# Patient Record
Sex: MALE | Race: WHITE
[De-identification: names, ages, dates, MRNs, and addresses within clinical notes are randomized per-mention and may not be internally consistent; named-entity substitution may affect disease eponyms.]

---

## 2021-08-22 ENCOUNTER — TRANSCRIPTION ENCOUNTER (OUTPATIENT)
Age: 46
End: 2021-08-22

## 2022-09-22 PROBLEM — Z00.00 ENCOUNTER FOR PREVENTIVE HEALTH EXAMINATION: Status: ACTIVE | Noted: 2022-09-22

## 2022-09-23 ENCOUNTER — APPOINTMENT (OUTPATIENT)
Dept: ORTHOPEDIC SURGERY | Facility: CLINIC | Age: 47
End: 2022-09-23

## 2022-09-23 VITALS — BODY MASS INDEX: 37.89 KG/M2 | HEIGHT: 68 IN | WEIGHT: 250 LBS

## 2022-09-23 PROCEDURE — 99203 OFFICE O/P NEW LOW 30 MIN: CPT

## 2022-09-23 PROCEDURE — 73521 X-RAY EXAM HIPS BI 2 VIEWS: CPT | Mod: 26

## 2022-09-23 RX ORDER — PREDNISONE 10 MG
TABLET ORAL
Refills: 0 | Status: ACTIVE | COMMUNITY

## 2022-09-23 RX ORDER — ESCITALOPRAM OXALATE 5 MG/1
TABLET, FILM COATED ORAL
Refills: 0 | Status: ACTIVE | COMMUNITY

## 2022-09-23 NOTE — DISCUSSION/SUMMARY
[de-identified] :  discussed x-rays with patient showing osteoarthritis bilateral hips.  Patient has greater trochanteric bursitis, discussed in detail patient.  Discussed treatment options including rest, anti-inflammatories, range-of-motion exercises, physical therapy, cortisone injection.  Patient currently takes a long-term daily  prednisone. advised patient to ice / heat area, stretch.  Physical therapy script given.  Patient will follow-up in 6-8 weeks for re-evaluation.  Call if symptoms worsen or change.  Patient understands agrees with plan.  Seen under supervision of Dr. House.

## 2022-09-23 NOTE — DATA REVIEWED
[FreeTextEntry1] :  bilateral hip x-rays for comparison: moderate osteoarthritis/degenerative changes.  No acute fractures, subluxations, dislocations.

## 2022-09-23 NOTE — HISTORY OF PRESENT ILLNESS
[de-identified] : Patient is a 47-year-old male here for right hip pain.  Patient states he has been having right hip pain for about 3-4 months with no injury or trauma.  States that he does  have history of lower back pain and multiple herniated discs.  Patient states that this pain in his hip is different than his usual pain.  Patient states that it hurts to in certain directions, walking long distances.  Patient denies groin pain. denies numbness and tingling, denies instability.

## 2022-09-23 NOTE — PHYSICAL EXAM
[Right] : right hip [NL (30)] : extension 30 degrees [] : non-antalgic [FreeTextEntry9] :   Mild pain to greater trochanter with range of motion [de-identified] :  good strength throughout [de-identified] :  warm well-perfused [TWNoteComboBox7] : flexion 110 degrees [de-identified] : adduction 30 degrees [de-identified] : external rotation 40 degrees [TWNoteComboBox6] : internal rotation 40 degrees

## 2022-11-17 ENCOUNTER — APPOINTMENT (OUTPATIENT)
Dept: ORTHOPEDIC SURGERY | Facility: CLINIC | Age: 47
End: 2022-11-17

## 2022-11-17 VITALS — HEIGHT: 68 IN | WEIGHT: 235 LBS | BODY MASS INDEX: 35.61 KG/M2

## 2022-11-17 PROCEDURE — 99203 OFFICE O/P NEW LOW 30 MIN: CPT

## 2022-11-17 PROCEDURE — 73562 X-RAY EXAM OF KNEE 3: CPT | Mod: LT

## 2022-11-17 NOTE — ASSESSMENT
[FreeTextEntry1] :  Recommending warm moist compresses, I will send naproxen to the pharmacy.  Patient believes he may have an allergy to penicillin, will prescribe clindamycin.  We discussed avoiding kneeling on the knee.  He has a scheduled follow-up appointment with Renato on 28th.  If symptoms worsen prior to that appointment he will give us a call\par \par This patient was seen under the supervision of Dr. House.\par

## 2022-11-17 NOTE — IMAGING
[de-identified] :   On examination of the left knee patient has mild swelling to the prepatellar bursa very subtle erythema compared to the right knee, mild warmth.  No evidence of knee effusion.  He has full range of motion of the knee.  No tenderness along the joint line.  No tenderness over the patella or quadriceps tendon, mild tenderness over the patella tendon, most point tenderness is noted over the tibial tubercle and prepatellar bursa.\par \par X-ray left knee mild arthritis no fracture or dislocation

## 2022-11-17 NOTE — HISTORY OF PRESENT ILLNESS
[de-identified] : 47-year-old male comes in today for evaluation his left anterior knee pain and swelling that began on November 15th.  Cannot recall specific injury.  He works as an  he does lot of kneeling on his knee.  He noticed some swelling to the knee.  Patient takes prednisone for  angioedema and hypereosinophilia, also takes Lexapro.

## 2022-11-28 ENCOUNTER — APPOINTMENT (OUTPATIENT)
Dept: ORTHOPEDIC SURGERY | Facility: CLINIC | Age: 47
End: 2022-11-28

## 2022-11-28 DIAGNOSIS — M70.42 PREPATELLAR BURSITIS, LEFT KNEE: ICD-10-CM

## 2022-11-28 DIAGNOSIS — M70.61 TROCHANTERIC BURSITIS, RIGHT HIP: ICD-10-CM

## 2022-11-28 PROCEDURE — 99213 OFFICE O/P EST LOW 20 MIN: CPT

## 2022-11-28 NOTE — PHYSICAL EXAM
[Right] : right hip [NL (30)] : extension 30 degrees [FreeTextEntry9] : Mild improvement with range of motion with minimal pain [de-identified] :  good strength throughout [de-identified] :  warm well-perfused [TWNoteComboBox7] : False [de-identified] : False [de-identified] : False [TWNoteComboBox6] : False [Left] : left knee [5___] : hamstring 5[unfilled]/5 [Negative] : negative Sindy's [] : non-antalgic

## 2022-11-28 NOTE — HISTORY OF PRESENT ILLNESS
[de-identified] : Patient is a 47-year-old male here for follow-up right hip greater trochanteric bursitis and left knee prepatellar bursitis.  Patient states overall he has been feeling better especially the left knee.  Patient states that he does still feel pain to the right hip even after physical therapy.  Patient denies numbness/tingling, denies instability.  Patient states hip pain still comes and goes depending on activity

## 2022-11-28 NOTE — DISCUSSION/SUMMARY
[de-identified] : Right hip greater trochanteric bursitis: MRI ordered for further evaluation.  Advised patient continue physical therapy, ice/heat, range of motion exercises.  Call 2 to 3 days after MRI discuss results.\par \par Left knee bursitis: Patient has made a full recovery, doing well, no complaints at this time.\par \par Patient will follow up in 8 weeks for reevaluation.  Call if any questions or concerns.  Patient understands agrees with plan.  Seen under supervision of Dr. House.

## 2022-12-11 ENCOUNTER — EMERGENCY (EMERGENCY)
Facility: HOSPITAL | Age: 47
LOS: 0 days | Discharge: HOME | End: 2022-12-11
Attending: EMERGENCY MEDICINE | Admitting: EMERGENCY MEDICINE
Payer: COMMERCIAL

## 2022-12-11 VITALS
OXYGEN SATURATION: 98 % | HEART RATE: 78 BPM | RESPIRATION RATE: 18 BRPM | SYSTOLIC BLOOD PRESSURE: 147 MMHG | DIASTOLIC BLOOD PRESSURE: 84 MMHG | TEMPERATURE: 98 F | WEIGHT: 240.08 LBS

## 2022-12-11 VITALS — HEART RATE: 87 BPM | SYSTOLIC BLOOD PRESSURE: 108 MMHG | DIASTOLIC BLOOD PRESSURE: 57 MMHG

## 2022-12-11 DIAGNOSIS — N13.2 HYDRONEPHROSIS WITH RENAL AND URETERAL CALCULOUS OBSTRUCTION: ICD-10-CM

## 2022-12-11 DIAGNOSIS — Z88.0 ALLERGY STATUS TO PENICILLIN: ICD-10-CM

## 2022-12-11 DIAGNOSIS — M54.9 DORSALGIA, UNSPECIFIED: ICD-10-CM

## 2022-12-11 DIAGNOSIS — Z20.822 CONTACT WITH AND (SUSPECTED) EXPOSURE TO COVID-19: ICD-10-CM

## 2022-12-11 LAB
ALBUMIN SERPL ELPH-MCNC: 4.2 G/DL — SIGNIFICANT CHANGE UP (ref 3.5–5.2)
ALP SERPL-CCNC: 49 U/L — SIGNIFICANT CHANGE UP (ref 30–115)
ALT FLD-CCNC: 24 U/L — SIGNIFICANT CHANGE UP (ref 0–41)
ANION GAP SERPL CALC-SCNC: 11 MMOL/L — SIGNIFICANT CHANGE UP (ref 7–14)
APPEARANCE UR: CLEAR — SIGNIFICANT CHANGE UP
AST SERPL-CCNC: 25 U/L — SIGNIFICANT CHANGE UP (ref 0–41)
BACTERIA # UR AUTO: NEGATIVE — SIGNIFICANT CHANGE UP
BASOPHILS # BLD AUTO: 0.06 K/UL — SIGNIFICANT CHANGE UP (ref 0–0.2)
BASOPHILS NFR BLD AUTO: 0.7 % — SIGNIFICANT CHANGE UP (ref 0–1)
BILIRUB SERPL-MCNC: 0.5 MG/DL — SIGNIFICANT CHANGE UP (ref 0.2–1.2)
BILIRUB UR-MCNC: NEGATIVE — SIGNIFICANT CHANGE UP
BUN SERPL-MCNC: 21 MG/DL — HIGH (ref 10–20)
CALCIUM SERPL-MCNC: 9.1 MG/DL — SIGNIFICANT CHANGE UP (ref 8.4–10.5)
CHLORIDE SERPL-SCNC: 101 MMOL/L — SIGNIFICANT CHANGE UP (ref 98–110)
CO2 SERPL-SCNC: 24 MMOL/L — SIGNIFICANT CHANGE UP (ref 17–32)
COLOR SPEC: COLORLESS — SIGNIFICANT CHANGE UP
CREAT SERPL-MCNC: 1.6 MG/DL — HIGH (ref 0.7–1.5)
DIFF PNL FLD: ABNORMAL
EGFR: 53 ML/MIN/1.73M2 — LOW
EOSINOPHIL # BLD AUTO: 0.69 K/UL — SIGNIFICANT CHANGE UP (ref 0–0.7)
EOSINOPHIL NFR BLD AUTO: 7.7 % — SIGNIFICANT CHANGE UP (ref 0–8)
EPI CELLS # UR: 0 /HPF — SIGNIFICANT CHANGE UP (ref 0–5)
FLUAV AG NPH QL: SIGNIFICANT CHANGE UP
FLUBV AG NPH QL: SIGNIFICANT CHANGE UP
GLUCOSE SERPL-MCNC: 107 MG/DL — HIGH (ref 70–99)
GLUCOSE UR QL: NEGATIVE — SIGNIFICANT CHANGE UP
HCT VFR BLD CALC: 37.6 % — LOW (ref 42–52)
HGB BLD-MCNC: 13.1 G/DL — LOW (ref 14–18)
HYALINE CASTS # UR AUTO: 0 /LPF — SIGNIFICANT CHANGE UP (ref 0–7)
IMM GRANULOCYTES NFR BLD AUTO: 0.2 % — SIGNIFICANT CHANGE UP (ref 0.1–0.3)
KETONES UR-MCNC: NEGATIVE — SIGNIFICANT CHANGE UP
LEUKOCYTE ESTERASE UR-ACNC: NEGATIVE — SIGNIFICANT CHANGE UP
LIDOCAIN IGE QN: 57 U/L — SIGNIFICANT CHANGE UP (ref 7–60)
LYMPHOCYTES # BLD AUTO: 1.44 K/UL — SIGNIFICANT CHANGE UP (ref 1.2–3.4)
LYMPHOCYTES # BLD AUTO: 16.1 % — LOW (ref 20.5–51.1)
MCHC RBC-ENTMCNC: 29.7 PG — SIGNIFICANT CHANGE UP (ref 27–31)
MCHC RBC-ENTMCNC: 34.8 G/DL — SIGNIFICANT CHANGE UP (ref 32–37)
MCV RBC AUTO: 85.3 FL — SIGNIFICANT CHANGE UP (ref 80–94)
MONOCYTES # BLD AUTO: 0.64 K/UL — HIGH (ref 0.1–0.6)
MONOCYTES NFR BLD AUTO: 7.2 % — SIGNIFICANT CHANGE UP (ref 1.7–9.3)
NEUTROPHILS # BLD AUTO: 6.07 K/UL — SIGNIFICANT CHANGE UP (ref 1.4–6.5)
NEUTROPHILS NFR BLD AUTO: 68.1 % — SIGNIFICANT CHANGE UP (ref 42.2–75.2)
NITRITE UR-MCNC: NEGATIVE — SIGNIFICANT CHANGE UP
NRBC # BLD: 0 /100 WBCS — SIGNIFICANT CHANGE UP (ref 0–0)
PH UR: 6 — SIGNIFICANT CHANGE UP (ref 5–8)
PLATELET # BLD AUTO: 195 K/UL — SIGNIFICANT CHANGE UP (ref 130–400)
POTASSIUM SERPL-MCNC: 4.1 MMOL/L — SIGNIFICANT CHANGE UP (ref 3.5–5)
POTASSIUM SERPL-SCNC: 4.1 MMOL/L — SIGNIFICANT CHANGE UP (ref 3.5–5)
PROT SERPL-MCNC: 7.7 G/DL — SIGNIFICANT CHANGE UP (ref 6–8)
PROT UR-MCNC: NEGATIVE — SIGNIFICANT CHANGE UP
RBC # BLD: 4.41 M/UL — LOW (ref 4.7–6.1)
RBC # FLD: 14.1 % — SIGNIFICANT CHANGE UP (ref 11.5–14.5)
RBC CASTS # UR COMP ASSIST: 2 /HPF — SIGNIFICANT CHANGE UP (ref 0–4)
RSV RNA NPH QL NAA+NON-PROBE: SIGNIFICANT CHANGE UP
SARS-COV-2 RNA SPEC QL NAA+PROBE: SIGNIFICANT CHANGE UP
SODIUM SERPL-SCNC: 136 MMOL/L — SIGNIFICANT CHANGE UP (ref 135–146)
SP GR SPEC: 1.02 — SIGNIFICANT CHANGE UP (ref 1.01–1.03)
UROBILINOGEN FLD QL: SIGNIFICANT CHANGE UP
WBC # BLD: 8.92 K/UL — SIGNIFICANT CHANGE UP (ref 4.8–10.8)
WBC # FLD AUTO: 8.92 K/UL — SIGNIFICANT CHANGE UP (ref 4.8–10.8)
WBC UR QL: 1 /HPF — SIGNIFICANT CHANGE UP (ref 0–5)

## 2022-12-11 PROCEDURE — 74177 CT ABD & PELVIS W/CONTRAST: CPT | Mod: 26,MA

## 2022-12-11 PROCEDURE — 99285 EMERGENCY DEPT VISIT HI MDM: CPT

## 2022-12-11 RX ORDER — SODIUM CHLORIDE 9 MG/ML
1000 INJECTION INTRAMUSCULAR; INTRAVENOUS; SUBCUTANEOUS ONCE
Refills: 0 | Status: COMPLETED | OUTPATIENT
Start: 2022-12-11 | End: 2022-12-11

## 2022-12-11 RX ORDER — MORPHINE SULFATE 50 MG/1
4 CAPSULE, EXTENDED RELEASE ORAL ONCE
Refills: 0 | Status: DISCONTINUED | OUTPATIENT
Start: 2022-12-11 | End: 2022-12-11

## 2022-12-11 RX ORDER — TRAMADOL HYDROCHLORIDE 50 MG/1
1 TABLET ORAL
Qty: 20 | Refills: 0
Start: 2022-12-11 | End: 2022-12-15

## 2022-12-11 RX ORDER — KETOROLAC TROMETHAMINE 30 MG/ML
15 SYRINGE (ML) INJECTION ONCE
Refills: 0 | Status: DISCONTINUED | OUTPATIENT
Start: 2022-12-11 | End: 2022-12-11

## 2022-12-11 RX ADMIN — MORPHINE SULFATE 4 MILLIGRAM(S): 50 CAPSULE, EXTENDED RELEASE ORAL at 02:16

## 2022-12-11 RX ADMIN — SODIUM CHLORIDE 1000 MILLILITER(S): 9 INJECTION INTRAMUSCULAR; INTRAVENOUS; SUBCUTANEOUS at 02:44

## 2022-12-11 RX ADMIN — MORPHINE SULFATE 4 MILLIGRAM(S): 50 CAPSULE, EXTENDED RELEASE ORAL at 04:55

## 2022-12-11 RX ADMIN — Medication 15 MILLIGRAM(S): at 03:14

## 2022-12-11 NOTE — ED PROVIDER NOTE - CLINICAL SUMMARY MEDICAL DECISION MAKING FREE TEXT BOX
47-year-old male recently diagnosed with renal colic presented to ED for left flank pain.  Found to have 6 mm left UVJ stone, pain is well controlled, UA negative for infection, patient was evaluated by urology and is at present stable for discharge home.  Advised to follow-up with urology, patient understands he has to call for appointment tomorrow morning, strict return precautions given.

## 2022-12-11 NOTE — ED PROVIDER NOTE - PROGRESS NOTE DETAILS
SG: urology consulted. Will continue to monitor and reassess. Received pt from Dr. George, Urology evaluated pt, can be dc'ed to f/u with Dr. Diop on Tramadol. Pt is currently comfortable. EP: The patient was endorsed to me by Dr. Kim to follow-up with urology consult and dispo.  The patient was seen by urology and cleared for discharge home, he was advised to call tomorrow to set up an appointment with urologist.  Patient is  currently pain-free, appears very well, and the amenable with the discharge plan.  Prescription for tramadol was sent to the patient's pharmacy up on urology recommendation. Patient to be discharged from ED. Any available test results were discussed with patient and/or family. Verbal instructions given, including instructions to return to ED immediately for any new, worsening, or concerning symptoms. Patient endorsed understanding. Written discharge instructions additionally given, including follow-up plan.  Patient was given opportunity to ask questions.

## 2022-12-11 NOTE — ED ADULT TRIAGE NOTE - CHIEF COMPLAINT QUOTE
Pt presents to the ED c/o of L flank pain. Pt states he went to [facility] Wednesday and was d/x with a 6cm Kidney stone. Pt states he was D/C'd home with Flomax and Toradol. Pt states pain is becoming to great and is straining to pee. Pt denies any fevers

## 2022-12-11 NOTE — ED PROVIDER NOTE - NS ED ROS FT
Constitutional:  No fever, chills, lethargy, or abnormal weight loss  Eyes:  No eye pain or visual changes  ENMT: No nasal discharge, no toothache, no sore throat. No neck pain or stiffness  Cardiac:  No chest pain or palpitations  Respiratory:  No cough or respiratory distress.   GI:  No nausea, vomiting, diarrhea or abdominal pain.  :  No dysuria, frequency or burning.  MS:  see HPI  Neuro:  No headache. No numbness, weakness, or tingling.   Skin:  No skin rash  Except as documented in the HPI,  all other systems are negative

## 2022-12-11 NOTE — ED PROVIDER NOTE - OBJECTIVE STATEMENT
47-year-old male past medical history angioedema and hypereosinophilia presenting with complaints of kidney stone.  Patient complaining of left sided back pain, 10 out of 10, sharp, unable to remain still.  Reports that this has been going on for a while, [facility] where he was diagnosed with a kidney stone that is 6 mm, was told that can be operated on or seen if it could be urinated out and was advised to follow the latter route.  Patient reports he has been on Flomax and ketorolac daily, ran out of ketorolac today, took some ibuprofen recently, still in significant amounts of pain.  Denies any fever, chills, nausea, vomiting, CP, SOB, or any changes in urination frequency or color.

## 2022-12-11 NOTE — ED ADULT NURSE NOTE - OBJECTIVE STATEMENT
pt presents to ED c/o of L flank pain. Pt states he went to [facility] Wednesday and was d/x with a 6cm Kidney stone. Pt states he was D/C'd home with Flomax and Toradol. Pt states pain is becoming to great and is straining to pee. Pt denies any fevers

## 2022-12-11 NOTE — CONSULT NOTE ADULT - SUBJECTIVE AND OBJECTIVE BOX
Urology Consult Note:         [ x ] A 10 Point Review of Systems was negative except where noted    Allergies: penicillin (Unknown)    SOCIAL HISTORY: No illicit drug use    Vital Signs Last 24 Hrs  T(C): 36.4 (11 Dec 2022 01:00), Max: 36.4 (11 Dec 2022 01:00)  T(F): 97.6 (11 Dec 2022 01:00), Max: 97.6 (11 Dec 2022 01:00)  HR: 87 (11 Dec 2022 06:19) (78 - 87)  BP: 108/57 (11 Dec 2022 06:19) (108/57 - 147/84)  RR: 18 (11 Dec 2022 01:00) (18 - 18)  SpO2: 98% (11 Dec 2022 01:00) (98% - 98%)    Parameters below as of 11 Dec 2022 01:00  Patient On (Oxygen Delivery Method): room air      PHYSICAL EXAM:  Constitutional: NAD, well-developed  HEENT: NC/AT  Back: No CVA ttp bilaterally  Resp: No accessory respiratory muscle use  Abd: Soft, NT/ND. No suprapubic ttp  Cardio: +S1/S2  : Freely voiding  Ext: No edema or cyanosis, AGUIALR x 4  Neuro: Awake and alert  Psych: Normal mood, normal affect  Skin: Good color, non diaphoretic      LABS:                       13.1   8.92  )-----------( 195      ( 11 Dec 2022 02:50 )             37.6     12-  136  |  101  |  21<H>  ----------------------------<  107<H>  4.1   |  24  |  1.6<H>    Ca    9.1      11 Dec 2022 02:50    TPro  7.7  /  Alb  4.2  /  TBili  0.5  /  DBili  x   /  AST  25  /  ALT  24  /  AlkPhos  49  12-11      Urinalysis Basic - ( 11 Dec 2022 05:15 )    Color: Colorless / Appearance: Clear / S.022 / pH: x  Gluc: x / Ketone: Negative  / Bili: Negative / Urobili: <2 mg/dL   Blood: x / Protein: Negative / Nitrite: Negative   Leuk Esterase: Negative / RBC: 2 /HPF / WBC 1 /HPF   Sq Epi: x / Non Sq Epi: 0 /HPF / Bacteria: Negative      RADIOLOGY & ADDITIONAL STUDIES:    ACC: [id number] EXAM:  CT ABDOMEN AND PELVIS IC                          PROCEDURE DATE:  2022      INTERPRETATION:  CLINICAL HISTORY / REASON FOR EXAM: Left flank pain.    TECHNIQUE: Contiguous axial CT images were obtained from the lower chest   to the pubic symphysis following the administration of 100 mL Omnipaque   350 intravenous contrast. Oral contrast was not administered. Reformatted   images in the coronal and sagittal planes were acquired.    COMPARISON CT: No studies are available for direct comparison.    FINDINGS:    LOWER CHEST: Left lower lobe consolidative opacity with air bronchograms.    HEPATOBILIARY: Unremarkable.    SPLEEN: Splenomegaly measuring approximately 15 cm (8/35). 2 similar   appearing splenic hypodensities which are indeterminate measuring 1.5 cm   (5/96) and 3 cm (5/186).    PANCREAS: Unremarkable.    ADRENAL GLANDS: Unremarkable.    KIDNEYS: Mild left hydroureteronephrosis secondary to a left UVJ calculus   measuring 6 x 3 x 4 mm (894 HU). No right kidney obstructing calculus or   hydronephrosis.    ABDOMINOPELVIC NODES: Unremarkable.    PELVIC ORGANS: Urinary bladder wall thickening versus underdistention,   correlate with urinalysis..    PERITONEUM/MESENTERY/BOWEL: No bowel obstruction orwall thickening.   Unremarkable appendix. No pneumoperitoneum or ascites.. Small bilateral   fat-containing inguinal hernias. An umbilical hernia contains fat and a   tiny focus of loculated fluid.    BONES/SOFT TISSUES: Degenerative changes of the spine. Moderate to severe   degenerative changes of the right hip.    VASCULAR: Abdominal aorta is normal in caliber..      IMPRESSION:    Mild left hydroureteronephrosis secondary to a left UVJ calculus   measuring up to 6 mm.    Underdistention versus urinary bladder wall thickening. Correlate with   urinalysis.    Left lower lobe consolidative opacity with air bronchograms. Correlate   for signs/symptoms of pneumonia. Follow-up to demonstrate resolution is   recommended.    Splenomegaly with 2 similar appearing indeterminate splenic hypodensities   measuring 1.5 and 3.0 cm.    --- End of Report ---    NOA WILCOX MD; Resident Radiologist  This document has been electronically signed.  ADRIÁN KWOK MD; Attending Radiologist  This document has been electronically signed. Dec 11 2022  5:55AM   Urology Consult Note: 47 year old male pmh angioedema and hypereosinophilia presenting with complaints of kidney stone. Patient was in [facility] on Wednesday where he was diagnosed with a kidney stone that is 6 mm, was told that can be operated on or attempt to continue with conservative management and trial of passage with patient electing conservative management. Patient presents today with complaints of pain as his pain medication (Toradol x3 days) had run out and is complaining of moderate left sided flank pain, with urinary hesitancy, frequency and urgency. Patient reports he has also been on Flomax, took some ibuprofen recently, still in pain.  Denies any fever, chills, nausea, vomiting, CP, SOB, or other LUTS.    [ x ] A 10 Point Review of Systems was negative except where noted    Allergies: penicillin (Unknown)    SOCIAL HISTORY: No illicit drug use    Vital Signs Last 24 Hrs  T(C): 36.4 (11 Dec 2022 01:00), Max: 36.4 (11 Dec 2022 01:00)  T(F): 97.6 (11 Dec 2022 01:00), Max: 97.6 (11 Dec 2022 01:00)  HR: 87 (11 Dec 2022 06:19) (78 - 87)  BP: 108/57 (11 Dec 2022 06:19) (108/57 - 147/84)  RR: 18 (11 Dec 2022 01:00) (18 - 18)  SpO2: 98% (11 Dec 2022 01:00) (98% - 98%)    Parameters below as of 11 Dec 2022 01:00  Patient On (Oxygen Delivery Method): room air      PHYSICAL EXAM:  Constitutional: NAD, well-developed, well nourished, comfortably sitting in chair  HEENT: NC/AT  Back: No right CVA ttp, +mild left CVA ttp  Resp: No accessory respiratory muscle use  Abd: Soft, NT/ND. No suprapubic ttp  Cardio: +S1/S2  : Freely voiding  Ext: No edema or cyanosis, AGUILAR x 4  Neuro: Awake and alert  Psych: Normal mood, normal affect  Skin: Good color, non diaphoretic      LABS:                       13.1   8.92  )-----------( 195      ( 11 Dec 2022 02:50 )             37.6     12-  136  |  101  |  21<H>  ----------------------------<  107<H>  4.1   |  24  |  1.6<H>    Ca    9.1      11 Dec 2022 02:50    TPro  7.7  /  Alb  4.2  /  TBili  0.5  /  DBili  x   /  AST  25  /  ALT  24  /  AlkPhos  49  12-11      Urinalysis Basic - ( 11 Dec 2022 05:15 )    Color: Colorless / Appearance: Clear / S.022 / pH: x  Gluc: x / Ketone: Negative  / Bili: Negative / Urobili: <2 mg/dL   Blood: x / Protein: Negative / Nitrite: Negative   Leuk Esterase: Negative / RBC: 2 /HPF / WBC 1 /HPF   Sq Epi: x / Non Sq Epi: 0 /HPF / Bacteria: Negative      RADIOLOGY & ADDITIONAL STUDIES:    ACC: [id number] EXAM:  CT ABDOMEN AND PELVIS IC                          PROCEDURE DATE:  2022      INTERPRETATION:  CLINICAL HISTORY / REASON FOR EXAM: Left flank pain.    TECHNIQUE: Contiguous axial CT images were obtained from the lower chest   to the pubic symphysis following the administration of 100 mL Omnipaque   350 intravenous contrast. Oral contrast was not administered. Reformatted   images in the coronal and sagittal planes were acquired.    COMPARISON CT: No studies are available for direct comparison.    FINDINGS:    LOWER CHEST: Left lower lobe consolidative opacity with air bronchograms.    HEPATOBILIARY: Unremarkable.    SPLEEN: Splenomegaly measuring approximately 15 cm (8/35). 2 similar   appearing splenic hypodensities which are indeterminate measuring 1.5 cm   (5/96) and 3 cm (5/186).    PANCREAS: Unremarkable.    ADRENAL GLANDS: Unremarkable.    KIDNEYS: Mild left hydroureteronephrosis secondary to a left UVJ calculus   measuring 6 x 3 x 4 mm (894 HU). No right kidney obstructing calculus or   hydronephrosis.    ABDOMINOPELVIC NODES: Unremarkable.    PELVIC ORGANS: Urinary bladder wall thickening versus underdistention,   correlate with urinalysis..    PERITONEUM/MESENTERY/BOWEL: No bowel obstruction or wall thickening.   Unremarkable appendix. No pneumoperitoneum or ascites.. Small bilateral   fat-containing inguinal hernias. An umbilical hernia contains fat and a   tiny focus of loculated fluid.    BONES/SOFT TISSUES: Degenerative changes of the spine. Moderate to severe   degenerative changes of the right hip.    VASCULAR: Abdominal aorta is normal in caliber..      IMPRESSION:    Mild left hydroureteronephrosis secondary to a left UVJ calculus   measuring up to 6 mm.    Underdistention versus urinary bladder wall thickening. Correlate with   urinalysis.    Left lower lobe consolidative opacity with air bronchograms. Correlate   for signs/symptoms of pneumonia. Follow-up to demonstrate resolution is   recommended.    Splenomegaly with 2 similar appearing indeterminate splenic hypodensities   measuring 1.5 and 3.0 cm.    --- End of Report ---    NOA WILCOX MD; Resident Radiologist  This document has been electronically signed.  ADRIÁN KWOK MD; Attending Radiologist  This document has been electronically signed. Dec 11 2022  5:55AM

## 2022-12-11 NOTE — ED PROVIDER NOTE - PATIENT PORTAL LINK FT
You can access the FollowMyHealth Patient Portal offered by [facility] by registering at the following website: [email or web address]. By joining [employer]’s FollowMyHealth portal, you will also be able to view your health information using other applications (apps) compatible with our system.

## 2022-12-11 NOTE — ED PROVIDER NOTE - CARE PROVIDER_API CALL
Orestes Diop)  Urology  [address] [place] [address], [address]  [place] [address]  Phone: [phone]  Fax: [phone]  Follow Up Time:

## 2022-12-11 NOTE — ED PROVIDER NOTE - CARE PROVIDERS DIRECT ADDRESSES
[email or web address]

## 2022-12-11 NOTE — ED ADULT NURSE NOTE - CHIEF COMPLAINT QUOTE
Pt presents to the ED c/o of L flank pain. Pt states he went to [facility] Wednesday and was d/x with a 6cm Kidney stone. Pt states he was D/C'd home with Flomax and Toradol. Pt states pain is becoming to great and is straining to pee. Pt denies any fevers

## 2022-12-11 NOTE — ED ADULT NURSE REASSESSMENT NOTE - NS ED NURSE REASSESS COMMENT FT1
Pt assessed, A&OX4. Pt denies pain/discomfort at this time, awaiting dispo. No acute distress noted. Safety and comfort measures maintained. Will continue to monitor

## 2022-12-11 NOTE — CONSULT NOTE ADULT - ASSESSMENT
Plan:  - No acute surgical/ intervention indicated at this time; patient's pain is controlled in the ED with fluids and pain medication. At this time patient wishes to continue with trial of passage and pain management at home.  - Recommend Tramadol q4-6 hrs prn for pain control  - Continue flomax if not contraindicated  - Continue antiemetics prn  - Encourage increased hydration  - Encourage increased ambulation  - Recommend strain all urine to catch the stone if it passes so that stone may be sent for analysis; provided patient with strainer  - Recommend OP f/u with Dr. Diop this week for further urologic management - Please call the office tomorrow morning for an appointment  - Recommend strict return precautions: Please return to the ED if you develop fevers of 100.3F or greater, intractable pain, intractable vomiting, inability to tolerate po, maintain secretions or difficulty breathing or overall worsening of symptoms.  - ED team and patient agreeable to plan  47 year old male recently seen in [facility] was diagnosed with a kidney stone that is 6 mm, now presenting with pain after completing is previously prescribed pain medication. Pain controlled in ED with fluids and pain medication, patient wishes to continue with conservative management at this time and trial of passage at home.     Plan:  - No acute surgical/ intervention indicated at this time; patient's pain is controlled in the ED with fluids and pain medication. At this time patient wishes to continue with trial of passage and pain management at home.  - Recommend continue pain control with Tramadol q4-6 hrs prn   - Continue flomax if not contraindicated  - Continue antiemetics prn  - Encourage increased hydration  - Encourage increased ambulation  - Recommend strain all urine to catch the stone if it passes so that stone may be sent for analysis; provided patient with strainer  - Recommend OP f/u with Dr. Diop this week for further urologic management - Please call the office tomorrow morning for an appointment  - Recommend strict return precautions: Please return to the ED if you develop fevers of 100.3F or greater, intractable pain, intractable vomiting, inability to tolerate po, maintain secretions or difficulty breathing or overall worsening of symptoms.  - ED team and patient agreeable to plan

## 2022-12-11 NOTE — ED PROVIDER NOTE - ATTENDING CONTRIBUTION TO CARE
47-year-old male with a past medical history of nephrolithiasis presents with left flank discomfort.  Patient was out side hospital couple of days ago and diagnosed with 6 mm kidney stone.  No fever or chills.  Pain has returned.  No chest pain.  On exam slightly uncomfortable appearing but nontoxic, no CVA tenderness or abdominal tenderness.  CT with obstructive stone.  Mild hydronephrosis.  Still uncomfortable after multiple rounds of pain medicine so we will consult urology.

## 2022-12-11 NOTE — ED ADULT NURSE NOTE - NSSUHOSCREENINGYN_ED_ALL_ED
Yes - the patient is able to be screened
Airway patent, Nasal mucosa clear. Mouth with normal mucosa. Throat has no vesicles, no oropharyngeal exudates and uvula is midline.

## 2022-12-11 NOTE — ED PROVIDER NOTE - PHYSICAL EXAMINATION
Gen: Alert, NAD, well appearing, pacing  Head: NC, AT, EOMI  Pulm: Bilateral BS, normal resp effort, no wheeze/stridor/retractions  CV: RRR, no M/R/G, +dist pulses  Abd: soft, NT/ND, pos Left CVAT  Mskel: no edema/erythema/cyanosis  Skin: no rash, warm/dry  Neuro: AAOx3, no sensory/motor deficits, CN 2-12 grossly intact

## 2022-12-13 ENCOUNTER — TRANSCRIPTION ENCOUNTER (OUTPATIENT)
Age: 47
End: 2022-12-13

## 2022-12-13 ENCOUNTER — INPATIENT (INPATIENT)
Facility: HOSPITAL | Age: 47
LOS: 0 days | Discharge: HOME | End: 2022-12-13
Attending: UROLOGY | Admitting: UROLOGY

## 2022-12-13 VITALS
HEART RATE: 88 BPM | HEIGHT: 68 IN | TEMPERATURE: 96 F | WEIGHT: 240.08 LBS | DIASTOLIC BLOOD PRESSURE: 83 MMHG | SYSTOLIC BLOOD PRESSURE: 137 MMHG | RESPIRATION RATE: 20 BRPM | OXYGEN SATURATION: 100 %

## 2022-12-13 VITALS — HEART RATE: 74 BPM | DIASTOLIC BLOOD PRESSURE: 86 MMHG | SYSTOLIC BLOOD PRESSURE: 153 MMHG | RESPIRATION RATE: 18 BRPM

## 2022-12-13 LAB
ALBUMIN SERPL ELPH-MCNC: 4 G/DL — SIGNIFICANT CHANGE UP (ref 3.5–5.2)
ALP SERPL-CCNC: 49 U/L — SIGNIFICANT CHANGE UP (ref 30–115)
ALT FLD-CCNC: 21 U/L — SIGNIFICANT CHANGE UP (ref 0–41)
ANION GAP SERPL CALC-SCNC: 6 MMOL/L — LOW (ref 7–14)
APPEARANCE UR: CLEAR — SIGNIFICANT CHANGE UP
AST SERPL-CCNC: 22 U/L — SIGNIFICANT CHANGE UP (ref 0–41)
BACTERIA # UR AUTO: ABNORMAL
BASOPHILS # BLD AUTO: 0.04 K/UL — SIGNIFICANT CHANGE UP (ref 0–0.2)
BASOPHILS NFR BLD AUTO: 0.9 % — SIGNIFICANT CHANGE UP (ref 0–1)
BILIRUB SERPL-MCNC: 0.6 MG/DL — SIGNIFICANT CHANGE UP (ref 0.2–1.2)
BILIRUB UR-MCNC: NEGATIVE — SIGNIFICANT CHANGE UP
BUN SERPL-MCNC: 16 MG/DL — SIGNIFICANT CHANGE UP (ref 10–20)
CALCIUM SERPL-MCNC: 9.1 MG/DL — SIGNIFICANT CHANGE UP (ref 8.4–10.5)
CHLORIDE SERPL-SCNC: 101 MMOL/L — SIGNIFICANT CHANGE UP (ref 98–110)
CO2 SERPL-SCNC: 29 MMOL/L — SIGNIFICANT CHANGE UP (ref 17–32)
COLOR SPEC: YELLOW — SIGNIFICANT CHANGE UP
CREAT SERPL-MCNC: 1.4 MG/DL — SIGNIFICANT CHANGE UP (ref 0.7–1.5)
DIFF PNL FLD: ABNORMAL
EGFR: 62 ML/MIN/1.73M2 — SIGNIFICANT CHANGE UP
EOSINOPHIL # BLD AUTO: 0.36 K/UL — SIGNIFICANT CHANGE UP (ref 0–0.7)
EOSINOPHIL NFR BLD AUTO: 7.7 % — SIGNIFICANT CHANGE UP (ref 0–8)
EPI CELLS # UR: NEGATIVE — SIGNIFICANT CHANGE UP
GLUCOSE SERPL-MCNC: 102 MG/DL — HIGH (ref 70–99)
GLUCOSE UR QL: NEGATIVE MG/DL — SIGNIFICANT CHANGE UP
HCT VFR BLD CALC: 35.9 % — LOW (ref 42–52)
HGB BLD-MCNC: 12.5 G/DL — LOW (ref 14–18)
IMM GRANULOCYTES NFR BLD AUTO: 0.2 % — SIGNIFICANT CHANGE UP (ref 0.1–0.3)
KETONES UR-MCNC: NEGATIVE — SIGNIFICANT CHANGE UP
LEUKOCYTE ESTERASE UR-ACNC: NEGATIVE — SIGNIFICANT CHANGE UP
LYMPHOCYTES # BLD AUTO: 1.08 K/UL — LOW (ref 1.2–3.4)
LYMPHOCYTES # BLD AUTO: 23.2 % — SIGNIFICANT CHANGE UP (ref 20.5–51.1)
MCHC RBC-ENTMCNC: 30.6 PG — SIGNIFICANT CHANGE UP (ref 27–31)
MCHC RBC-ENTMCNC: 34.8 G/DL — SIGNIFICANT CHANGE UP (ref 32–37)
MCV RBC AUTO: 88 FL — SIGNIFICANT CHANGE UP (ref 80–94)
MONOCYTES # BLD AUTO: 0.4 K/UL — SIGNIFICANT CHANGE UP (ref 0.1–0.6)
MONOCYTES NFR BLD AUTO: 8.6 % — SIGNIFICANT CHANGE UP (ref 1.7–9.3)
NEUTROPHILS # BLD AUTO: 2.76 K/UL — SIGNIFICANT CHANGE UP (ref 1.4–6.5)
NEUTROPHILS NFR BLD AUTO: 59.4 % — SIGNIFICANT CHANGE UP (ref 42.2–75.2)
NITRITE UR-MCNC: NEGATIVE — SIGNIFICANT CHANGE UP
NRBC # BLD: 0 /100 WBCS — SIGNIFICANT CHANGE UP (ref 0–0)
PH UR: 6.5 — SIGNIFICANT CHANGE UP (ref 5–8)
PLATELET # BLD AUTO: 178 K/UL — SIGNIFICANT CHANGE UP (ref 130–400)
POTASSIUM SERPL-MCNC: 4.4 MMOL/L — SIGNIFICANT CHANGE UP (ref 3.5–5)
POTASSIUM SERPL-SCNC: 4.4 MMOL/L — SIGNIFICANT CHANGE UP (ref 3.5–5)
PROT SERPL-MCNC: 7.8 G/DL — SIGNIFICANT CHANGE UP (ref 6–8)
PROT UR-MCNC: NEGATIVE MG/DL — SIGNIFICANT CHANGE UP
RBC # BLD: 4.08 M/UL — LOW (ref 4.7–6.1)
RBC # FLD: 13.9 % — SIGNIFICANT CHANGE UP (ref 11.5–14.5)
RBC CASTS # UR COMP ASSIST: SIGNIFICANT CHANGE UP /HPF
SARS-COV-2 RNA SPEC QL NAA+PROBE: SIGNIFICANT CHANGE UP
SODIUM SERPL-SCNC: 136 MMOL/L — SIGNIFICANT CHANGE UP (ref 135–146)
SP GR SPEC: 1.01 — SIGNIFICANT CHANGE UP (ref 1.01–1.03)
UROBILINOGEN FLD QL: 0.2 MG/DL — SIGNIFICANT CHANGE UP
WBC # BLD: 4.65 K/UL — LOW (ref 4.8–10.8)
WBC # FLD AUTO: 4.65 K/UL — LOW (ref 4.8–10.8)
WBC UR QL: SIGNIFICANT CHANGE UP /HPF

## 2022-12-13 PROCEDURE — 99223 1ST HOSP IP/OBS HIGH 75: CPT | Mod: 25

## 2022-12-13 PROCEDURE — 52352 CYSTOURETERO W/STONE REMOVE: CPT | Mod: LT

## 2022-12-13 PROCEDURE — 52332 CYSTOSCOPY AND TREATMENT: CPT | Mod: LT

## 2022-12-13 PROCEDURE — 99285 EMERGENCY DEPT VISIT HI MDM: CPT

## 2022-12-13 RX ORDER — ACETAMINOPHEN 500 MG
650 TABLET ORAL EVERY 6 HOURS
Refills: 0 | Status: DISCONTINUED | OUTPATIENT
Start: 2022-12-13 | End: 2022-12-13

## 2022-12-13 RX ORDER — LANOLIN ALCOHOL/MO/W.PET/CERES
3 CREAM (GRAM) TOPICAL AT BEDTIME
Refills: 0 | Status: DISCONTINUED | OUTPATIENT
Start: 2022-12-13 | End: 2022-12-13

## 2022-12-13 RX ORDER — SODIUM CHLORIDE 9 MG/ML
1000 INJECTION, SOLUTION INTRAVENOUS
Refills: 0 | Status: DISCONTINUED | OUTPATIENT
Start: 2022-12-13 | End: 2022-12-13

## 2022-12-13 RX ORDER — ONDANSETRON 8 MG/1
4 TABLET, FILM COATED ORAL ONCE
Refills: 0 | Status: DISCONTINUED | OUTPATIENT
Start: 2022-12-13 | End: 2022-12-13

## 2022-12-13 RX ORDER — PHENAZOPYRIDINE HCL 100 MG
1 TABLET ORAL
Qty: 6 | Refills: 0
Start: 2022-12-13 | End: 2022-12-14

## 2022-12-13 RX ORDER — ESCITALOPRAM OXALATE 10 MG/1
0 TABLET, FILM COATED ORAL
Qty: 0 | Refills: 0 | DISCHARGE

## 2022-12-13 RX ORDER — OXYCODONE HYDROCHLORIDE 5 MG/1
5 TABLET ORAL ONCE
Refills: 0 | Status: DISCONTINUED | OUTPATIENT
Start: 2022-12-13 | End: 2022-12-13

## 2022-12-13 RX ORDER — CHLORHEXIDINE GLUCONATE 213 G/1000ML
1 SOLUTION TOPICAL
Refills: 0 | Status: DISCONTINUED | OUTPATIENT
Start: 2022-12-13 | End: 2022-12-13

## 2022-12-13 RX ORDER — ESCITALOPRAM OXALATE 10 MG/1
10 TABLET, FILM COATED ORAL DAILY
Refills: 0 | Status: DISCONTINUED | OUTPATIENT
Start: 2022-12-13 | End: 2022-12-13

## 2022-12-13 RX ORDER — KETOROLAC TROMETHAMINE 30 MG/ML
15 SYRINGE (ML) INJECTION ONCE
Refills: 0 | Status: DISCONTINUED | OUTPATIENT
Start: 2022-12-13 | End: 2022-12-13

## 2022-12-13 RX ORDER — ONDANSETRON 8 MG/1
4 TABLET, FILM COATED ORAL EVERY 8 HOURS
Refills: 0 | Status: DISCONTINUED | OUTPATIENT
Start: 2022-12-13 | End: 2022-12-13

## 2022-12-13 RX ORDER — HYDROMORPHONE HYDROCHLORIDE 2 MG/ML
0.5 INJECTION INTRAMUSCULAR; INTRAVENOUS; SUBCUTANEOUS
Refills: 0 | Status: DISCONTINUED | OUTPATIENT
Start: 2022-12-13 | End: 2022-12-13

## 2022-12-13 RX ORDER — TAMSULOSIN HYDROCHLORIDE 0.4 MG/1
1 CAPSULE ORAL
Qty: 14 | Refills: 0
Start: 2022-12-13 | End: 2022-12-26

## 2022-12-13 RX ORDER — SODIUM CHLORIDE 9 MG/ML
1000 INJECTION INTRAMUSCULAR; INTRAVENOUS; SUBCUTANEOUS ONCE
Refills: 0 | Status: COMPLETED | OUTPATIENT
Start: 2022-12-13 | End: 2022-12-13

## 2022-12-13 RX ADMIN — Medication 15 MILLIGRAM(S): at 11:26

## 2022-12-13 RX ADMIN — SODIUM CHLORIDE 1000 MILLILITER(S): 9 INJECTION INTRAMUSCULAR; INTRAVENOUS; SUBCUTANEOUS at 07:49

## 2022-12-13 NOTE — DISCHARGE NOTE PROVIDER - NSDCMRMEDTOKEN_GEN_ALL_CORE_FT
ESCITALOPRAM 10MG TABLETS: TAKE 1 TABLET BY MOUTH EVERY DAY  Flomax 0.4 mg oral capsule: 1 cap(s) orally once a day MDD:0.4mg  phenazopyridine 200 mg oral tablet: 1 tab(s) orally 3 times a day (after meals) MDD:600mg  PREDNISONE 10 MG TABLET:

## 2022-12-13 NOTE — BRIEF OPERATIVE NOTE - OPERATION/FINDINGS
left distal ureteral stone removed with basket  left ureteral stent left in place -- to be removed on friday by pulling string

## 2022-12-13 NOTE — DISCHARGE NOTE PROVIDER - NSDCFUSCHEDAPPT_GEN_ALL_CORE_FT
Roland Langston  [facility]  UROLOGY [address]  Scheduled Appointment: 12/20/2022    [facility]  [address]  Scheduled Appointment: 01/23/2023

## 2022-12-13 NOTE — ASU DISCHARGE PLAN (ADULT/PEDIATRIC) - NS MD DC FALL RISK RISK
For information on Fall & Injury Prevention, visit: [email or web address] OR  [email or web address] OR  https://www.cdc.gov/steadi/patient.html

## 2022-12-13 NOTE — ED ADULT NURSE NOTE - NSIMPLEMENTINTERV_GEN_ALL_ED
Implemented All Universal Safety Interventions:  [place] to call system. Call bell, personal items and telephone within reach. Instruct patient to call for assistance. Room bathroom lighting operational. Non-slip footwear when patient is off stretcher. Physically safe environment: no spills, clutter or unnecessary equipment. Stretcher in lowest position, wheels locked, appropriate side rails in place.

## 2022-12-13 NOTE — DISCHARGE NOTE PROVIDER - NSDCCPCAREPLAN_GEN_ALL_CORE_FT
PRINCIPAL DISCHARGE DIAGNOSIS  Diagnosis: Kidney stone  Assessment and Plan of Treatment: Renal Colic is pain that is caused by passing a kidney stone. The pain   Contact a health care provider if:  You have a fever or chills.  Your urine smells bad or looks cloudy.  You have pain or burning when you pass urine.  Get help right away if:  Your flank pain or groin pain suddenly worsens.  You become confused or disoriented or you lose consciousness.  This information is not intended to replace advice given to you by your health care provider. Make sure you discuss any questions you have with your health care provider.

## 2022-12-13 NOTE — ASU DISCHARGE PLAN (ADULT/PEDIATRIC) - CARE PROVIDER_API CALL
Cam Mcmillan)  Urology  [address] [place] [address], [address]  [place] [address]  Phone: [phone]  Fax: [phone]  Scheduled Appointment: 12/16/2022

## 2022-12-13 NOTE — CHART NOTE - NSCHARTNOTEFT_GEN_A_CORE
PACU ANESTHESIA ADMISSION NOTE      Procedure: Manipulation, calculus, ureter    Cystourethroscopy with insertion of ureteral stent in adult      Post op diagnosis:  Left ureteral stone    Hydronephrosis, left    DANIELA (acute kidney injury)    Lt flank pain        ____  Intubated  TV:______       Rate: ______      FiO2: ______    __x__  Patent Airway    _x___  Full return of protective reflexes    __x__  Full recovery from anesthesia / back to baseline     Vitals:   T: 98          R: 16                 BP:  159/94                Sat:  98%                 P: 96      Mental Status:  _x___ Awake   _____ Alert   _____ Drowsy   _____ Sedated    Nausea/Vomiting:  __x__ NO  ______Yes,   See Post - Op Orders          Pain Scale (0-10):  _____    Treatment: ____ None    ___x_ See Post - Op/PCA Orders    Post - Operative Fluids:   ____ Oral   __x__ See Post - Op Orders    Plan: Discharge:   __x__Home       _____Floor     _____Critical Care    _____  Other:_________________    Comments:

## 2022-12-13 NOTE — ED PROVIDER NOTE - PHYSICAL EXAMINATION
CONSTITUTIONAL: well-developed, well-nourished, in no acute distress comfortable appearing  SKIN: warm, dry  HEAD: Normocephalic  EYES: no conjunctival erythema  ENT: no nasal discharge, airway clear  NECK: full ROM  CARD: regular rate and rhythm  RESP: normal respiratory effort, no wheezes, rales or rhonchi  ABD: soft, non-distended, non-tender  : no cva tenderness  EXT: moving all extremities spontaneously  NEURO: alert and oriented, grossly unremarkable  PSYCH: cooperative, appropriate

## 2022-12-13 NOTE — H&P ADULT - NS ATTEND AMEND GEN_ALL_CORE FT
seen at bedside  has Acute kidney injury likely due to obstructing left ureteral stone  also with left flank pain and left hydronephrosis  history of renal stones years ago  pain has been unrelenting -- has been to three ERs in the last week  agrees to proceed with procedure today -- left ureteroscopy laser lithotripsy and left ureteral stent placement  aware that stent needs to come out in a few weeks and that the stone needs to be fragmented

## 2022-12-13 NOTE — DISCHARGE NOTE PROVIDER - CARE PROVIDER_API CALL
Cam Mcmillan)  Urology  [address] [place] [address], [address]  [place] [address]  Phone: [phone]  Fax: [phone]  Scheduled Appointment: 12/16/2022

## 2022-12-13 NOTE — H&P ADULT - NSHPLABSRESULTS_GEN_ALL_CORE
Labs:  CAPILLARY BLOOD GLUCOSE                          12.5   4.65  )-----------( 178      ( 13 Dec 2022 07:30 )             35.9       Auto Neutrophil %: 59.4 % (22 @ 07:30)  Auto Immature Granulocyte %: 0.2 % (22 @ 07:30)        136  |  101  |  16  ----------------------------<  102<H>  4.4   |  29  |  1.4      Calcium, Total Serum: 9.1 mg/dL (22 @ 07:30)      LFTs:             7.8  | 0.6  | 22       ------------------[49      ( 13 Dec 2022 07:30 )  4.0  | x    | 21          Lipase:x      Amylase:x             Coags:            Urinalysis Basic - ( 13 Dec 2022 08:10 )    Color: Yellow / Appearance: Clear / S.015 / pH: x  Gluc: x / Ketone: Negative  / Bili: Negative / Urobili: 0.2 mg/dL   Blood: x / Protein: Negative mg/dL / Nitrite: Negative   Leuk Esterase: Negative / RBC: 1-2 /HPF / WBC 3-5 /HPF   Sq Epi: x / Non Sq Epi: Negative / Bacteria: Few      RADIOLOGY & ADDITIONAL STUDIES:    EXAM:  CT ABDOMEN AND PELVIS IC                      PROCEDURE DATE:  2022     CLINICAL HISTORY / REASON FOR EXAM: Left flank pain.    FINDINGS:  LOWER CHEST: Left lower lobe consolidative opacity with air bronchograms.  HEPATOBILIARY: Unremarkable.  SPLEEN: Splenomegaly measuring approximately 15 cm (8/35). 2 similar   appearing splenic hypodensities which are indeterminate measuring 1.5 cm   (5/96) and 3 cm (5/186).    PANCREAS: Unremarkable.    ADRENAL GLANDS: Unremarkable.    KIDNEYS: Mild left hydroureteronephrosis secondary to a left UVJ calculus   measuring 6 x 3 x 4 mm (894 HU). No right kidney obstructing calculus or   hydronephrosis.    IMPRESSION:    Mild left hydroureteronephrosis secondary to a left UVJ calculus   measuring up to 6 mm.    Underdistention versus urinary bladder wall thickening. Correlate with   urinalysis.    Left lower lobe consolidative opacity with air bronchograms. Correlate   for signs/symptoms of pneumonia. Follow-up to demonstrate resolution is   recommended.    Splenomegaly with 2 similar appearing indeterminate splenic hypodensities   measuring 1.5 and 3.0 cm.    --- End of Report ---    NOA WILCOX MD; Resident Radiolog

## 2022-12-13 NOTE — BRIEF OPERATIVE NOTE - NSICDXBRIEFPOSTOP_GEN_ALL_CORE_FT
POST-OP DIAGNOSIS:  Left ureteral stone 13-Dec-2022 15:52:07  Cam Mcmillan  Hydronephrosis, left 13-Dec-2022 15:52:37  Cam Mcmillan  DANIELA (acute kidney injury) 13-Dec-2022 15:52:47  Cam Mcmillan  Lt flank pain 13-Dec-2022 15:53:27  Cam Mcmillan

## 2022-12-13 NOTE — CONSULT NOTE ADULT - ASSESSMENT
48 y/o M PMHx hypereosinophilia & angioedema presenting to ED for 1 week h/o flank pain and intermittent urinary dribbling  Pt was seen at [facility] ED yesterday for flank pain. CTAP was done w/ findings c/w L 4 x 6 mm UVJ stone.     Plan:  INCOMPLETE 48 y/o M PMHx hypereosinophilia & angioedema presenting to ED for 1 week h/o flank pain and intermittent urinary dribbling  Pt was seen at [facility] ED yesterday for flank pain. CTAP was done w/ findings c/w L 4 x 6 mm UVJ stone.     Plan:  OR today for cystoscopy and stent placement  Keep NPO  IVF  IV pain meds prn and IV antiemetics      Pt d/w Dr. Mcmillan

## 2022-12-13 NOTE — ED PROVIDER NOTE - OBJECTIVE STATEMENT
48 y/o M PMHx hypereosinophilia & angioedema & h/o kidney stones ( 10 years ago & managed conservatively  presenting to ED for 1 week h/o flank pain and intermittent urinary dribbling  Pt was seen at [facility] ED yesterday for flank pain. CTAP was done w/ findings c/w L 4 x 6 mm UVJ stone. Pt reports he was seen at [facility] last week for flank pain and was DXed w/ kidney stone and opted for conservative management vs surgical intervention in hopes that stone would pass on its own. Denies fever, chills, n/v, lack of urine output, gross hematuria, SOB.

## 2022-12-13 NOTE — BRIEF OPERATIVE NOTE - NSICDXBRIEFPREOP_GEN_ALL_CORE_FT
PRE-OP DIAGNOSIS:  Left ureteral stone 13-Dec-2022 15:50:54  Cam Mcmillan  Left flank pain 13-Dec-2022 15:51:02  Cam Mcmillan  Hydronephrosis, left 13-Dec-2022 15:51:12  Cam Mcmillan  DANIELA (acute kidney injury) 13-Dec-2022 15:51:20  Cam Mcmillan

## 2022-12-13 NOTE — H&P ADULT - NSHPPHYSICALEXAM_GEN_ALL_CORE
Vital Signs Last 24 Hrs  T(C): 35.6 (13 Dec 2022 06:49), Max: 35.6 (13 Dec 2022 06:49)  T(F): 96.1 (13 Dec 2022 06:49), Max: 96.1 (13 Dec 2022 06:49)  HR: 88 (13 Dec 2022 06:49) (88 - 88)  BP: 137/83 (13 Dec 2022 06:49) (137/83 - 137/83)  BP(mean): --  RR: 20 (13 Dec 2022 06:49) (20 - 20)  SpO2: 100% (13 Dec 2022 06:49) (100% - 100%)    Parameters below as of 13 Dec 2022 06:49  Patient On (Oxygen Delivery Method): room air        PHYSICAL EXAM:  GENERAL: laying in bed, appearing comfortable and in NAD  HEENT: Castillo mucous membranes  NECK: Supple  CHEST/LUNG: even respirations b/l; no accessory muscle use  ABDOMEN: Soft, Nontender, Nondistended  : Back: (+) mild L CVA TTP; no R CVA ttp  SKIN: warm  Ext: no calf TTP b/l  Neuro:  A&Ox4

## 2022-12-13 NOTE — ED PROVIDER NOTE - ATTENDING CONTRIBUTION TO CARE
47-year-old male history of hypereosinophilia diagnosed with a 4 x 6 mm UVJ stone on imaging 2 days ago returns with persistent pain which is controlled with oral analgesics at home, requesting intervention as pain is recurrent, no fever, no vomiting, physical exam unremarkable, will repeat labs and consult urology

## 2022-12-13 NOTE — BRIEF OPERATIVE NOTE - NSICDXBRIEFPROCEDURE_GEN_ALL_CORE_FT
PROCEDURES:  Manipulation, calculus, ureter 13-Dec-2022 15:50:38  Cam Mcmillan  Cystourethroscopy with insertion of ureteral stent in adult 13-Dec-2022 15:50:43  Cam Mcmillan

## 2022-12-13 NOTE — CONSULT NOTE ADULT - SUBJECTIVE AND OBJECTIVE BOX
HPI:  46 y/o M PMHx hypereosinophilia & angioedema & h/o kidney stones ( 10 years ago & managed conservatively  presenting to ED for 1 week h/o flank pain and intermittent urinary dribbling  Pt was seen at [facility] ED yesterday for flank pain. CTAP was done w/ findings c/w L 4 x 6 mm UVJ stone. Pt reports he was seen at [facility] [place] last week for flank pain and was DXed w/ kidney stone and opted for conservative management vs surgical intervention in hopes that stone would pass on its own    Pt denies fever, chills, n/v, lack of urine output, gross hematuria, SOB          PAST MEDICAL & SURGICAL HISTORY:  Kidney stones  MEDICATIONS  (STANDING):  prednisone 5mg     Allergies    penicillin (Unknown)    Intolerances    Vital Signs Last 24 Hrs  T(C): 35.6 (13 Dec 2022 06:49), Max: 35.6 (13 Dec 2022 06:49)  T(F): 96.1 (13 Dec 2022 06:49), Max: 96.1 (13 Dec 2022 06:49)  HR: 88 (13 Dec 2022 06:49) (88 - 88)  BP: 137/83 (13 Dec 2022 06:49) (137/83 - 137/83)  BP(mean): --  RR: 20 (13 Dec 2022 06:49) (20 - 20)  SpO2: 100% (13 Dec 2022 06:49) (100% - 100%)    Parameters below as of 13 Dec 2022 06:49  Patient On (Oxygen Delivery Method): room air        PHYSICAL EXAM:  GENERAL: laying in bed, appearing comfortable and in NAD  HEENT: Castillo mucous membranes  NECK: Supple  CHEST/LUNG: even respirations b/l; no accessory muscle use  ABDOMEN: Soft, Nontender, Nondistended  : Back: (+) mild L CVA TTP; no R CVA ttp  SKIN: warm  Ext: no calf TTP b/l  Neuro:  A&Ox4   Labs:  CAPILLARY BLOOD GLUCOSE                          12.5   4.65  )-----------( 178      ( 13 Dec 2022 07:30 )             35.9       Auto Neutrophil %: 59.4 % (22 @ 07:30)  Auto Immature Granulocyte %: 0.2 % (22 @ 07:30)    12-13    136  |  101  |  16  ----------------------------<  102<H>  4.4   |  29  |  1.4      Calcium, Total Serum: 9.1 mg/dL (22 @ 07:30)      LFTs:             7.8  | 0.6  | 22       ------------------[49      ( 13 Dec 2022 07:30 )  4.0  | x    | 21          Lipase:x      Amylase:x             Coags:            Urinalysis Basic - ( 13 Dec 2022 08:10 )    Color: Yellow / Appearance: Clear / S.015 / pH: x  Gluc: x / Ketone: Negative  / Bili: Negative / Urobili: 0.2 mg/dL   Blood: x / Protein: Negative mg/dL / Nitrite: Negative   Leuk Esterase: Negative / RBC: 1-2 /HPF / WBC 3-5 /HPF   Sq Epi: x / Non Sq Epi: Negative / Bacteria: Few      RADIOLOGY & ADDITIONAL STUDIES:    EXAM:  CT ABDOMEN AND PELVIS IC                          PROCEDURE DATE:  2022    INTERPRETATION:  CLINICAL HISTORY / REASON FOR EXAM: Left flank pain.    FINDINGS:  LOWER CHEST: Left lower lobe consolidative opacity with air bronchograms.  HEPATOBILIARY: Unremarkable.  SPLEEN: Splenomegaly measuring approximately 15 cm (8/35). 2 similar   appearing splenic hypodensities which are indeterminate measuring 1.5 cm   (5/96) and 3 cm (5/186).    PANCREAS: Unremarkable.    ADRENAL GLANDS: Unremarkable.    KIDNEYS: Mild left hydroureteronephrosis secondary to a left UVJ calculus   measuring 6 x 3 x 4 mm (894 HU). No right kidney obstructing calculus or   hydronephrosis.    ABDOMINOPELVIC NODES: Unremarkable.    PELVIC ORGANS: Urinary bladder wall thickening versus underdistention,   correlate with urinalysis..  PERITONEUM/MESENTERY/BOWEL: No bowel obstruction or wall thickening.   Unremarkable appendix. No pneumoperitoneum or ascites.. Small bilateral   fat-containing inguinal hernias. An umbilical hernia contains fat and a   tiny focus of loculated fluid.    BONES/SOFT TISSUES: Degenerative changes of the spine. Moderate to severe   degenerative changes of the right hip.    VASCULAR: Abdominal aorta is normal in caliber..  IMPRESSION:    Mild left hydroureteronephrosis secondary to a left UVJ calculus   measuring up to 6 mm.    Underdistention versus urinary bladder wall thickening. Correlate with   urinalysis.    Left lower lobe consolidative opacity with air bronchograms. Correlate   for signs/symptoms of pneumonia. Follow-up to demonstrate resolution is   recommended.    Splenomegaly with 2 similar appearing indeterminate splenic hypodensities   measuring 1.5 and 3.0 cm.    --- End of Report ---    NOA WILCOX MD; Resident Radiologist  This document has been electronically signed.  ADRIÁN KWOK MD; Attending Radiologist  This document has been electronically signed. Dec 11 2022  5:55AM

## 2022-12-13 NOTE — H&P ADULT - ASSESSMENT
48 y/o M PMHx hypereosinophilia & angioedema presenting to ED for 1 week h/o flank pain and intermittent urinary dribbling  Pt was seen at [facility] ED yesterday for flank pain. CTAP was done w/ findings c/w L 4 x 6 mm UVJ stone.     Plan:  OR today for cystoscopy and stent placement  Keep NPO  IVF  IV pain meds prn and IV antiemetics      Pt d/w Dr. Mcmillan

## 2022-12-13 NOTE — H&P ADULT - HISTORY OF PRESENT ILLNESS
48 y/o M PMHx hypereosinophilia & angioedema & h/o kidney stones ( 10 years ago & managed conservatively  presenting to ED for 1 week h/o flank pain and intermittent urinary dribbling  Pt was seen at [facility] ED yesterday for flank pain. CTAP was done w/ findings c/w L 4 x 6 mm UVJ stone. Pt reports he was seen at [facility] last week for flank pain and was DXed w/ kidney stone and opted for conservative management vs surgical intervention in hopes that stone would pass on its own    Pt denies fever, chills, n/v, lack of urine output, gross hematuria, SOB

## 2022-12-13 NOTE — PRE-OP CHECKLIST - LAST TOOK
[General Appearance - In No Acute Distress] : in no acute distress [] : no respiratory distress [Respiration, Rhythm And Depth] : normal respiratory rhythm and effort [Auscultation Breath Sounds / Voice Sounds] : lungs were clear to auscultation bilaterally [Heart Rate And Rhythm] : heart rate was normal and rhythm regular [Affect] : the affect was normal [Mood] : the mood was normal clears

## 2022-12-13 NOTE — DISCHARGE NOTE PROVIDER - NSDCFUADDAPPT_GEN_ALL_CORE_FT
Please follow up with Dr. Mcmillan on 12/16/22 at his office.  You may take over the counter Tylenol as needed for pain as per directions on the bottle.

## 2022-12-13 NOTE — ED PROVIDER NOTE - NS ED ROS FT
Constitutional: No fever   Eyes:  No visual changes  Ears:  No hearing changes  Neck: No neck pain  Cardiac:  No chest pain  Respiratory:  No SOB   GI:  No abdominal pain, nausea, or vomiting +flank pain  :  No dysuria  MS:  No back pain  Neuro:  No headache or weakness.  No LOC  Skin:  No skin rash

## 2022-12-14 LAB
CULTURE RESULTS: NO GROWTH — SIGNIFICANT CHANGE UP
SPECIMEN SOURCE: SIGNIFICANT CHANGE UP

## 2022-12-15 PROBLEM — N20.0 CALCULUS OF KIDNEY: Chronic | Status: ACTIVE | Noted: 2022-12-13

## 2022-12-16 ENCOUNTER — APPOINTMENT (OUTPATIENT)
Dept: UROLOGY | Facility: CLINIC | Age: 47
End: 2022-12-16

## 2022-12-16 VITALS
SYSTOLIC BLOOD PRESSURE: 130 MMHG | DIASTOLIC BLOOD PRESSURE: 89 MMHG | HEIGHT: 68 IN | BODY MASS INDEX: 35.61 KG/M2 | WEIGHT: 235 LBS | HEART RATE: 78 BPM

## 2022-12-16 DIAGNOSIS — Z80.3 FAMILY HISTORY OF MALIGNANT NEOPLASM OF BREAST: ICD-10-CM

## 2022-12-16 DIAGNOSIS — Z83.3 FAMILY HISTORY OF DIABETES MELLITUS: ICD-10-CM

## 2022-12-16 DIAGNOSIS — Z78.9 OTHER SPECIFIED HEALTH STATUS: ICD-10-CM

## 2022-12-16 DIAGNOSIS — N20.0 CALCULUS OF KIDNEY: ICD-10-CM

## 2022-12-16 DIAGNOSIS — Z82.49 FAMILY HISTORY OF ISCHEMIC HEART DISEASE AND OTHER DISEASES OF THE CIRCULATORY SYSTEM: ICD-10-CM

## 2022-12-16 PROCEDURE — 99213 OFFICE O/P EST LOW 20 MIN: CPT

## 2022-12-19 DIAGNOSIS — Z79.891 LONG TERM (CURRENT) USE OF OPIATE ANALGESIC: ICD-10-CM

## 2022-12-19 DIAGNOSIS — Z79.52 LONG TERM (CURRENT) USE OF SYSTEMIC STEROIDS: ICD-10-CM

## 2022-12-19 DIAGNOSIS — Z20.822 CONTACT WITH AND (SUSPECTED) EXPOSURE TO COVID-19: ICD-10-CM

## 2022-12-19 DIAGNOSIS — N13.2 HYDRONEPHROSIS WITH RENAL AND URETERAL CALCULOUS OBSTRUCTION: ICD-10-CM

## 2022-12-19 DIAGNOSIS — N17.9 ACUTE KIDNEY FAILURE, UNSPECIFIED: ICD-10-CM

## 2022-12-19 DIAGNOSIS — Z87.442 PERSONAL HISTORY OF URINARY CALCULI: ICD-10-CM

## 2022-12-19 DIAGNOSIS — Z88.0 ALLERGY STATUS TO PENICILLIN: ICD-10-CM

## 2022-12-19 DIAGNOSIS — R10.9 UNSPECIFIED ABDOMINAL PAIN: ICD-10-CM

## 2022-12-19 LAB — NIDUS STONE QN: SIGNIFICANT CHANGE UP

## 2022-12-20 ENCOUNTER — APPOINTMENT (OUTPATIENT)
Dept: UROLOGY | Facility: CLINIC | Age: 47
End: 2022-12-20

## 2022-12-22 ENCOUNTER — NON-APPOINTMENT (OUTPATIENT)
Age: 47
End: 2022-12-22

## 2023-01-23 ENCOUNTER — APPOINTMENT (OUTPATIENT)
Dept: ORTHOPEDIC SURGERY | Facility: CLINIC | Age: 48
End: 2023-01-23

## 2023-03-03 ENCOUNTER — APPOINTMENT (OUTPATIENT)
Dept: UROLOGY | Facility: CLINIC | Age: 48
End: 2023-03-03

## 2023-03-21 ENCOUNTER — APPOINTMENT (OUTPATIENT)
Dept: UROLOGY | Facility: CLINIC | Age: 48
End: 2023-03-21

## 2023-04-03 ENCOUNTER — APPOINTMENT (OUTPATIENT)
Dept: ORTHOPEDIC SURGERY | Facility: CLINIC | Age: 48
End: 2023-04-03
Payer: COMMERCIAL

## 2023-04-03 VITALS — WEIGHT: 238 LBS | HEIGHT: 68 IN | BODY MASS INDEX: 36.07 KG/M2

## 2023-04-03 DIAGNOSIS — M25.561 PAIN IN RIGHT KNEE: ICD-10-CM

## 2023-04-03 PROCEDURE — 20610 DRAIN/INJ JOINT/BURSA W/O US: CPT | Mod: RT

## 2023-04-03 PROCEDURE — 99213 OFFICE O/P EST LOW 20 MIN: CPT | Mod: 25

## 2023-04-03 PROCEDURE — 73562 X-RAY EXAM OF KNEE 3: CPT | Mod: RT

## 2023-04-03 NOTE — IMAGING
[de-identified] : Right knee exam is as follows: Minimal effusion noted.  Infrapatellar bursitis noted.  Able to perform active straight leg raise.  Knee flexion from 0 to 90 degrees with stiffness and pain.  Anterior tenderness to palpation.  Calf is soft and nontender.  Positive Sindy's.  Light touch intact throughout.  Mildly antalgic gait.\par \par Right knee x-rays taken in office today reveal no obvious fractures, subluxations, or dislocations.  Mild patellofemoral osteoarthritic changes noted.

## 2023-04-03 NOTE — HISTORY OF PRESENT ILLNESS
[de-identified] : Patient is a 47-year-old male who reports to office for evaluation of his right knee pain that is been aggravating him on and off for the past several months.  He states the pain has been worsening over the past few days.  Denies any direct trauma or injury to the area.  He has tried physical therapy and taking naproxen which has given him no relief.  Walking, up and down stairs, getting up from a seated position, flexing and extending the knee all aggravate the patient's pain.  Denies any numbness or tingling.

## 2023-04-03 NOTE — DISCUSSION/SUMMARY
[de-identified] : Patient may continue taking naproxen as needed for pain.  The patient was advised to rest/ice the area and may alternate with warm compresses as needed.  Knee compression sleeve advised for support/stability.\par \par The knee conditioning program from the [facility] was given to the patient so they may try that at home.  Right knee MRI ordered for further evaluation.  Patient was advised to call the office a few days after getting the MRI done to discuss results over the phone.\par \par With the patient's approval, and under sterile technique, I performed a steroid injection today.  See the attached procedure note for further details.  The patient was informed that their next cortisone injection could not be until a minimum of three months from today's date and the patient understands.  Explained to the patient that the full effect of the injection will take 3-5 days to kick in. \par \par She will follow-up in 6 weeks for further evaluation.  All of the patient's questions/concerns were answered in detail.\par \par The patient was seen under the supervision of Dr. Jones.\par

## 2023-04-03 NOTE — PROCEDURE
[Large Joint Injection] : Large joint injection [Right] : of the right [Knee] : knee [Pain] : pain [Alcohol] : alcohol [Ethyl Chloride sprayed topically] : ethyl chloride sprayed topically [Sterile technique used] : sterile technique used [____] : [unfilled] [] : Patient tolerated procedure well [Call if redness, pain or fever occur] : call if redness, pain or fever occur [Apply ice for 15min out of every hour for the next 12-24 hours as tolerated] : apply ice for 15 minutes out of every hour for the next 12-24 hours as tolerated [Risks, benefits, alternatives discussed / Verbal consent obtained] : the risks benefits, and alternatives have been discussed, and verbal consent was obtained

## 2023-04-12 ENCOUNTER — APPOINTMENT (OUTPATIENT)
Dept: MRI IMAGING | Facility: CLINIC | Age: 48
End: 2023-04-12
Payer: COMMERCIAL

## 2023-04-12 PROCEDURE — 73721 MRI JNT OF LWR EXTRE W/O DYE: CPT | Mod: RT

## 2023-05-17 ENCOUNTER — APPOINTMENT (OUTPATIENT)
Dept: ORTHOPEDIC SURGERY | Facility: CLINIC | Age: 48
End: 2023-05-17
Payer: COMMERCIAL

## 2023-05-17 DIAGNOSIS — M70.51 OTHER BURSITIS OF KNEE, RIGHT KNEE: ICD-10-CM

## 2023-05-17 PROCEDURE — 99214 OFFICE O/P EST MOD 30 MIN: CPT

## 2023-05-17 NOTE — HISTORY OF PRESENT ILLNESS
[de-identified] : cc rt knee \par \par 47 year old male presents rt knee. Pt is working as a  as full duty. Pt says no trauma/ injury. Pt says the pain has been on and off for some years but has recently started hurting again. He states the pain at rest is a 5 and then a 10 with activity on the pain scale. Pt says the pain is described as a sharp pain which causes him to have issues bending his leg and knee. Pt did go to physical therapy for it and states it did not help. He is taking nabumetone for the pain. Pt did receive a cortisone shot and it only helped a few days. He did have a bioscopy done by Dr Garvin.  Biopsy had insufficient cells to confirm a possible Hodgkin's lymphoma, he did not have the report for the hip MRI but he had a hip MRI with and without contrast at [facility] as well\par \par h/o high cholesterol \par Allergic penicillin  \par \par on exam right knee has no effusion is nontender over the joint line stable good range of motion, he is tender over the right greater than left tibial tuberosity which are elevated bilaterally clinically has an old Osgood-Schlatter's but due to his job as an  where he is constantly kneeling on them he is constantly inflaming the insertion of the patella tendon onto anterior tibial tuberosity\par \par recommending anti-inflammatories as well as formal physical therapy with a home program. Will follow up in 2-3 months\par \par He will follow-up with [facility] for his right lymph node which was biopsied there as well as his right hip possible lipoma in which she had a repeat MRI with and without contrast, will only follow him here for his anterior knee pain\par \par

## 2023-06-23 ENCOUNTER — APPOINTMENT (OUTPATIENT)
Dept: UROLOGY | Facility: CLINIC | Age: 48
End: 2023-06-23

## 2023-07-18 ENCOUNTER — APPOINTMENT (OUTPATIENT)
Dept: RHEUMATOLOGY | Facility: CLINIC | Age: 48
End: 2023-07-18
Payer: COMMERCIAL

## 2023-07-18 ENCOUNTER — NON-APPOINTMENT (OUTPATIENT)
Age: 48
End: 2023-07-18

## 2023-07-18 VITALS
HEIGHT: 68 IN | SYSTOLIC BLOOD PRESSURE: 127 MMHG | DIASTOLIC BLOOD PRESSURE: 80 MMHG | HEART RATE: 78 BPM | OXYGEN SATURATION: 98 % | WEIGHT: 235 LBS | BODY MASS INDEX: 35.61 KG/M2

## 2023-07-18 DIAGNOSIS — M25.421 EFFUSION, RIGHT ELBOW: ICD-10-CM

## 2023-07-18 PROCEDURE — 99204 OFFICE O/P NEW MOD 45 MIN: CPT

## 2023-07-18 RX ORDER — MELOXICAM 7.5 MG/1
7.5 TABLET ORAL DAILY
Qty: 60 | Refills: 0 | Status: ACTIVE | COMMUNITY
Start: 2023-07-18 | End: 1900-01-01

## 2023-07-19 NOTE — HISTORY OF PRESENT ILLNESS
[FreeTextEntry1] : Pt reports 1 week ago he developed right elbow swelling and pain that has been progressive and now involving his tricep. Denies fevers or chills and is able to flex to about 90 degrees before it becomes painful. He has swelling and pain the past before that resolved with antiinflammatory medications. He is taking naproxen but no improvement in his symptoms. Denies any trauma and states symptoms started abruptly. He has right knee pain that is intermittent and diagnosed with pre patellar bursitis, went to PT for one session and didn't find it helpful. Symptoms come and go. He works in construction and kneels a lot and when his knee flares it is painful and swollen constantly for 2 weeks until symptoms improve with NSAIDs. \par \par He has a history of hypereosinophilic syndrome diagnosed at 18 with episodes of swelling in face, hands, forearms, feet.  He had bone marrow biopsies in the past that were unrevealing. Follow with allergic Dr. Lanier, recommended hematologist at [facility] eosinophilia specialist. Pt is on chronic prednisone since 18, 10 mg usually then decreased last month to 5 mg, plans for possible Nucala

## 2023-07-19 NOTE — PHYSICAL EXAM
[General Appearance - Alert] : alert [General Appearance - In No Acute Distress] : in no acute distress [Sclera] : the sclera and conjunctiva were normal [PERRL With Normal Accommodation] : pupils were equal in size, round, and reactive to light [Extraocular Movements] : extraocular movements were intact [Outer Ear] : the ears and nose were normal in appearance [Oropharynx] : the oropharynx was normal [Neck Appearance] : the appearance of the neck was normal [Neck Cervical Mass (___cm)] : no neck mass was observed [Jugular Venous Distention Increased] : there was no jugular-venous distention [Thyroid Diffuse Enlargement] : the thyroid was not enlarged [Thyroid Nodule] : there were no palpable thyroid nodules [Auscultation Breath Sounds / Voice Sounds] : lungs were clear to auscultation bilaterally [Heart Rate And Rhythm] : heart rate was normal and rhythm regular [Heart Sounds] : normal S1 and S2 [Heart Sounds Gallop] : no gallops [Murmurs] : no murmurs [Heart Sounds Pericardial Friction Rub] : no pericardial rub [Bowel Sounds] : normal bowel sounds [Abdomen Soft] : soft [Abdomen Tenderness] : non-tender [Abdomen Mass (___ Cm)] : no abdominal mass palpated [Abnormal Walk] : normal gait [Nail Clubbing] : no clubbing  or cyanosis of the fingernails [Musculoskeletal - Swelling] : no joint swelling seen [Motor Tone] : muscle strength and tone were normal [FreeTextEntry1] : Right elbow mild swelling proximally in tricep with tenderness to palpation. Right knee anterior patellar swelling and focal area of tenderness [] : no rash [Affect] : the affect was normal [Mood] : the mood was normal

## 2023-07-19 NOTE — ASSESSMENT
[FreeTextEntry1] : Right elbow swelling\par -Possible bursitis\par -Attempted to aspirate elbow with no fluid obtained, and injected with kenalog 40 mg and lidocaine\par -Check x-ray and ultrasound of the right elbow\par -Start meloxicam 7.5 mg daily PRN

## 2023-07-31 ENCOUNTER — APPOINTMENT (OUTPATIENT)
Dept: ORTHOPEDIC SURGERY | Facility: CLINIC | Age: 48
End: 2023-07-31

## 2024-02-09 NOTE — ASU DISCHARGE PLAN (ADULT/PEDIATRIC) - PROCEDURE
PDMP reviewed by RN delegate; no aberrant behavior identified, prescription authorized.  Sent  by e-prescribe to preferred pharmacy.    Medication Sig    methylphenidate (METADATE CD) 40 MG CR capsule Take 1 capsule by mouth every morning.            Cystourethroscopy with insertion of ureteral stent in adult
